# Patient Record
Sex: FEMALE | Race: WHITE | Employment: FULL TIME | URBAN - METROPOLITAN AREA
[De-identification: names, ages, dates, MRNs, and addresses within clinical notes are randomized per-mention and may not be internally consistent; named-entity substitution may affect disease eponyms.]

---

## 2017-01-09 ENCOUNTER — OFFICE VISIT (OUTPATIENT)
Dept: FAMILY MEDICINE | Facility: CLINIC | Age: 52
End: 2017-01-09
Payer: COMMERCIAL

## 2017-01-09 VITALS
HEART RATE: 71 BPM | DIASTOLIC BLOOD PRESSURE: 67 MMHG | RESPIRATION RATE: 14 BRPM | SYSTOLIC BLOOD PRESSURE: 97 MMHG | BODY MASS INDEX: 22.08 KG/M2 | WEIGHT: 120 LBS | TEMPERATURE: 98.3 F | HEIGHT: 62 IN

## 2017-01-09 DIAGNOSIS — Z12.11 SCREENING FOR COLON CANCER: ICD-10-CM

## 2017-01-09 DIAGNOSIS — Z00.00 ENCOUNTER FOR ROUTINE ADULT HEALTH EXAMINATION WITHOUT ABNORMAL FINDINGS: Primary | ICD-10-CM

## 2017-01-09 DIAGNOSIS — Z30.41 ORAL CONTRACEPTIVE PILL SURVEILLANCE: ICD-10-CM

## 2017-01-09 DIAGNOSIS — Z11.59 NEED FOR HEPATITIS C SCREENING TEST: ICD-10-CM

## 2017-01-09 DIAGNOSIS — J40 BRONCHITIS: ICD-10-CM

## 2017-01-09 DIAGNOSIS — J45.990 EXERCISE-INDUCED ASTHMA: ICD-10-CM

## 2017-01-09 DIAGNOSIS — E78.5 HYPERLIPIDEMIA LDL GOAL <160: ICD-10-CM

## 2017-01-09 LAB
ANION GAP SERPL CALCULATED.3IONS-SCNC: 4 MMOL/L (ref 3–14)
BUN SERPL-MCNC: 11 MG/DL (ref 7–30)
CALCIUM SERPL-MCNC: 8.7 MG/DL (ref 8.5–10.1)
CHLORIDE SERPL-SCNC: 103 MMOL/L (ref 94–109)
CHOLEST SERPL-MCNC: 248 MG/DL
CO2 SERPL-SCNC: 30 MMOL/L (ref 20–32)
CREAT SERPL-MCNC: 0.82 MG/DL (ref 0.52–1.04)
GFR SERPL CREATININE-BSD FRML MDRD: 73 ML/MIN/1.7M2
GLUCOSE SERPL-MCNC: 91 MG/DL (ref 70–99)
HDLC SERPL-MCNC: 87 MG/DL
LDLC SERPL CALC-MCNC: 139 MG/DL
NONHDLC SERPL-MCNC: 161 MG/DL
POTASSIUM SERPL-SCNC: 3.9 MMOL/L (ref 3.4–5.3)
SODIUM SERPL-SCNC: 137 MMOL/L (ref 133–144)
TRIGL SERPL-MCNC: 108 MG/DL

## 2017-01-09 PROCEDURE — 86803 HEPATITIS C AB TEST: CPT | Mod: 90 | Performed by: NURSE PRACTITIONER

## 2017-01-09 PROCEDURE — 80048 BASIC METABOLIC PNL TOTAL CA: CPT | Performed by: NURSE PRACTITIONER

## 2017-01-09 PROCEDURE — 99000 SPECIMEN HANDLING OFFICE-LAB: CPT | Performed by: NURSE PRACTITIONER

## 2017-01-09 PROCEDURE — 80061 LIPID PANEL: CPT | Performed by: NURSE PRACTITIONER

## 2017-01-09 PROCEDURE — 99212 OFFICE O/P EST SF 10 MIN: CPT | Mod: 25 | Performed by: NURSE PRACTITIONER

## 2017-01-09 PROCEDURE — 36415 COLL VENOUS BLD VENIPUNCTURE: CPT | Performed by: NURSE PRACTITIONER

## 2017-01-09 PROCEDURE — 99396 PREV VISIT EST AGE 40-64: CPT | Performed by: NURSE PRACTITIONER

## 2017-01-09 RX ORDER — LEVONORGESTREL AND ETHINYL ESTRADIOL 0.15-0.03
1 KIT ORAL DAILY
Qty: 84 TABLET | Refills: 4 | Status: SHIPPED | OUTPATIENT
Start: 2017-01-09 | End: 2018-01-08

## 2017-01-09 RX ORDER — CODEINE PHOSPHATE AND GUAIFENESIN 10; 100 MG/5ML; MG/5ML
1 SOLUTION ORAL EVERY 4 HOURS PRN
Qty: 120 ML | Refills: 0 | Status: SHIPPED | OUTPATIENT
Start: 2017-01-09 | End: 2018-01-08

## 2017-01-09 RX ORDER — AZITHROMYCIN 250 MG/1
TABLET, FILM COATED ORAL
Qty: 6 TABLET | Refills: 0 | Status: SHIPPED | OUTPATIENT
Start: 2017-01-09 | End: 2018-01-08

## 2017-01-09 NOTE — MR AVS SNAPSHOT
After Visit Summary   1/9/2017    Linda Campuzano    MRN: [id number]           Patient Information     Date Of Birth          1965        Visit Information        Provider Department      1/9/2017 9:45 AM Anne Marie Jack [place]N [facility] [place]        Today's Diagnoses     Encounter for routine adult health examination without abnormal findings    -  1     Bronchitis         Hyperlipidemia LDL goal <160         Oral contraceptive pill surveillance         Screening for colon cancer           Care Instructions      Preventive Health Recommendations  Female Ages 50 - 64    Yearly exam: See your health care provider every year in order to  o Review health changes.   o Discuss preventive care.    o Review your medicines if your doctor has prescribed any.      Get a Pap test every three years (unless you have an abnormal result and your provider advises testing more often).    If you get Pap tests with HPV test, you only need to test every 5 years, unless you have an abnormal result.     You do not need a Pap test if your uterus was removed (hysterectomy) and you have not had cancer.    You should be tested each year for STDs (sexually transmitted diseases) if you're at risk.     Have a mammogram every 1 to 2 years.    Have a colonoscopy at age 50, or have a yearly FIT test (stool test). These exams screen for colon cancer.      Have a cholesterol test every 5 years, or more often if advised.    Have a diabetes test (fasting glucose) every three years. If you are at risk for diabetes, you should have this test more often.     If you are at risk for osteoporosis (brittle bone disease), think about having a bone density scan (DEXA).    Shots: Get a flu shot each year. Get a tetanus shot every 10 years.    Nutrition:     Eat at least 5 servings of fruits and vegetables each day.    Eat whole-grain bread, whole-wheat pasta and brown rice instead of white grains and rice.    Talk to your provider  about Calcium and Vitamin D.     Lifestyle    Exercise at least 150 minutes a week (30 minutes a day, 5 days a week). This will help you control your weight and prevent disease.    Limit alcohol to one drink per day.    No smoking.     Wear sunscreen to prevent skin cancer.     See your dentist every six months for an exam and cleaning.    See your eye doctor every 1 to 2 years.            Follow-ups after your visit        Future tests that were ordered for you today     Open Future Orders        Priority Expected Expires Ordered    Fecal colorectal cancer screen (FIT) Routine 1/30/2017 4/3/2017 1/9/2017            Who to contact     If you have questions or need follow up information about today's clinic visit or your schedule please contact [facility] [place] directly at [phone].  Normal or non-critical lab and imaging results will be communicated to you by MyChart, letter or phone within 4 business days after the clinic has received the results. If you do not hear from us within 7 days, please contact the clinic through [employer] or phone. If you have a critical or abnormal lab result, we will notify you by phone as soon as possible.  Submit refill requests through [employer] or call your pharmacy and they will forward the refill request to us. Please allow 3 business days for your refill to be completed.          Additional Information About Your Visit        [employer]inc Information     [employer] gives you secure access to your electronic health record. If you see a primary care provider, you can also send messages to your care team and make appointments. If you have questions, please call your primary care clinic.  If you do not have a primary care provider, please call [phone] and they will assist you.        Care EveryWhere ID     This is your Care EveryWhere ID. This could be used by other organizations to access your [place] medical records  [id number]        Your Vitals Were     Pulse  "Temperature Respirations Height BMI (Body Mass Index) Last Period    71 98.3  F (36.8  C) (Tympanic) 14 5' 1.5\" (1.562 m) 22.31 kg/m2 12/26/2016 (Approximate)       Blood Pressure from Last 3 Encounters:   01/09/17 97/67   02/26/16 106/69   02/25/15 115/74    Weight from Last 3 Encounters:   01/09/17 120 lb (54.432 kg)   02/26/16 118 lb (53.524 kg)   02/25/15 119 lb (53.978 kg)              We Performed the Following     Basic metabolic panel  (Ca, Cl, CO2, Creat, Gluc, K, Na, BUN)     Lipid Profile with reflex to direct LDL          Today's Medication Changes          These changes are accurate as of: 1/9/17 10:37 AM.  If you have any questions, ask your nurse or doctor.               Start taking these medicines.        Dose/Directions    azithromycin 250 MG tablet   Commonly known as:  ZITHROMAX   Used for:  Bronchitis   Started by:  Anne Marie Jack APRN CNP        Two tablets first day, then one tablet daily for four days.   Quantity:  6 tablet   Refills:  0       guaiFENesin-codeine 100-10 MG/5ML Soln solution   Commonly known as:  ROBITUSSIN AC   Used for:  Bronchitis   Started by:  Anne Marie Jack APRN CNP        Dose:  1 tsp.   Take 5 mLs by mouth every 4 hours as needed for cough   Quantity:  120 mL   Refills:  0            Where to get your medicines      Some of these will need a paper prescription and others can be bought over the counter.  Ask your nurse if you have questions.     Bring a paper prescription for each of these medications    - azithromycin 250 MG tablet  - guaiFENesin-codeine 100-10 MG/5ML Soln solution             Primary Care Provider Office Phone # Fax #    DENAE Rios [phone]       [address]  [place] [address]        Thank you!     Thank you for choosing [facility] [place]  for your care. Our goal is always to provide you with excellent care. Hearing back from our patients is one way we can continue to improve our " services. Please take a few minutes to complete the written survey that you may receive in the mail after your visit with us. Thank you!             Your Updated Medication List - Protect others around you: Learn how to safely use, store and throw away your medicines at www.disposemymeds.org.          This list is accurate as of: 1/9/17 10:37 AM.  Always use your most recent med list.                   Brand Name Dispense Instructions for use    albuterol 108 (90 BASE) MCG/ACT Inhaler    albuterol    1 each    Inhale 2 puffs into the lungs 4 times daily as needed       azithromycin 250 MG tablet    ZITHROMAX    6 tablet    Two tablets first day, then one tablet daily for four days.       CALCIUM MAGNESIUM PO      Take by mouth daily       guaiFENesin-codeine 100-10 MG/5ML Soln solution    ROBITUSSIN AC    120 mL    Take 5 mLs by mouth every 4 hours as needed for cough       levonorgestrel-ethinyl estradiol 0.15-30 MG-MCG per tablet    ALTAVERA    84 tablet    Take 1 tablet by mouth daily       multivitamin per tablet     0    1 Daily       VITAMIN C TABS   OR     0    1-2 Daily       vitamin E 400 UNIT capsule     0    1 Daily

## 2017-01-09 NOTE — PROGRESS NOTES
SUBJECTIVE:     CC: Linda Campuzano is an 51 year old woman who presents for preventive health visit.     Healthy Habits:    Do you get at least three servings of calcium containing foods daily (dairy, green leafy vegetables, etc.)? no, taking calcium and/or vitamin D supplement: yes     Amount of exercise or daily activities, outside of work: 4-5 day(s) per week    Problems taking medications regularly No    Medication side effects: No    Have you had an eye exam in the past two years? no    Do you see a dentist twice per year? yes  Do you have sleep apnea, excessive snoring or daytime drowsiness? Yes       Additional problems: URI        Today's PHQ-2 Score:   PHQ-2 ( 1999 Pfizer) 1/9/2017 2/26/2016   Q1: Little interest or pleasure in doing things 0 0   Q2: Feeling down, depressed or hopeless 0 0   PHQ-2 Score 0 0       Abuse: Current or Past(Physical, Sexual or Emotional)- No  Do you feel safe in your environment - Yes    Social History   Substance Use Topics     Smoking status: Never Smoker      Smokeless tobacco: Never Used     Alcohol Use: 3.0 - 3.6 oz/week     5-6 Standard drinks or equivalent per week      Comment: weekends moderate     The patient does not drink >3 drinks per day nor >7 drinks per week.    Recent Labs   Lab Test  02/26/16   0929  02/25/15   0851  11/14/13   0909   CHOL  291*  252*  275*   HDL  88  82  80   LDL  187*  155*  166*   TRIG  81  73  144   CHOLHDLRATIO   --   3.1  3.4   NHDL  203*   --    --        Reviewed orders with patient.  Reviewed health maintenance and updated orders accordingly - Yes    Mammo Decision Support:  Patient over age 50, mutual decision to screen reflected in health maintenance.  Pertinent mammograms are reviewed under the imaging tab.    History of abnormal Pap smear: NO - age 30- 65 PAP every 3 years recommended    All Histories reviewed and updated in Epic.  Past Medical History   Diagnosis Date     POD (perioral dermatitis)      Papanicolaou smear of  "cervix with atypical squamous cells cannot exclude high grade squamous intraepithelial lesion (ASC-H) 1992     cone biopsy- ca in situ     Abnormal Papanicolaou smear of cervix and cervical HPV 2001     cautery     Contraception      Schatzki's ring 2103     dilated     Environmental allergies      [place] trees and some flowers      Past Surgical History   Procedure Laterality Date     Hc enlarge breast with implant  1997     saline     Lasik bilateral       Hc repair ing hernia,6mo-5yr,reduc       bilateral     Upper gi endoscopy       acid reflux and Schatzki's ring     Colonoscopy  2007     + FH. NL- repeat 5 years     Social Hx: . Has 2 daughters.  for [employer]     Contraception: BCP     ROS:  C: NEGATIVE for fever, chills, change in weight  I: NEGATIVE for worrisome rashes, moles or lesions  E: NEGATIVE for vision changes or irritation; LASIK  ENT: NEGATIVE for ear, mouth and throat problems  R: POSITIVE for significant cough. Started with typical cold symptoms 1.5 weeks ago. Now chest congested and expectorates green colored phlegm. Unable to exercise as usual. Albuterol inhale prior to exercise.  B: NEGATIVE for masses, tenderness or discharge. Saline implants.  CV: NEGATIVE for chest pain, palpitations or peripheral edema  GI: NEGATIVE for nausea, abdominal pain, heartburn, or change in bowel habits. Due for colonoscopy but declines. Father had colon cancer. Colonoscopy 2007 had to be cut short due to hypotension. She didn't go back for barium enema as advised.      : NEGATIVE for unusual urinary or vaginal symptoms. Periods are regular. Wants to take BCP's for one more year. Worried about menopausal symptoms.   M: NEGATIVE for significant arthralgias or myalgia  N: NEGATIVE for weakness, dizziness or paresthesias  P: NEGATIVE for changes in mood or affect      OBJECTIVE:     BP 97/67 mmHg  Pulse 71  Temp(Src) 98.3  F (36.8  C) (Tympanic)  Resp 14  Ht 5' 1.5\" (1.562 " m)  Wt 120 lb (54.432 kg)  BMI 22.31 kg/m2  LMP 12/26/2016 (Approximate)  EXAM:  GENERAL: healthy, alert and deep cough  EYES: Eyes grossly normal to inspection, PERRL and conjunctivae and sclerae normal  HENT: ear canals and TM's normal, nose and mouth without ulcers or lesions  NECK: no adenopathy, no asymmetry, masses, or scars and thyroid normal to palpation  RESP: lungs clear to auscultation - no rales, rhonchi or wheezes  BREAST: normal without masses, tenderness or nipple discharge and no palpable axillary masses or adenopathy  CV: regular rate and rhythm, normal S1 S2, no S3 or S4, no murmur, click or rub, no peripheral edema and peripheral pulses strong  ABDOMEN: soft, nontender, no hepatosplenomegaly, no masses and bowel sounds normal  MS: no gross musculoskeletal defects noted, no edema  SKIN: no suspicious lesions or rashes  NEURO: Normal strength and tone, mentation intact and speech normal  PSYCH: mentation appears normal, affect normal/bright    ASSESSMENT/PLAN:      was seen today for physical.    Diagnoses and all orders for this visit:    Encounter for routine adult health examination without abnormal findings  -     Basic metabolic panel  (Ca, Cl, CO2, Creat, Gluc, K, Na, BUN)    Bronchitis  -     azithromycin (ZITHROMAX) 250 MG tablet; Two tablets first day, then one tablet daily for four days.  -     guaiFENesin-codeine (ROBITUSSIN AC) 100-10 MG/5ML SOLN solution; Take 5 mLs by mouth every 4 hours as needed for cough    Hyperlipidemia LDL goal <160  -     Lipid Profile with reflex to direct LDL    Oral contraceptive pill surveillance  -     levonorgestrel-ethinyl estradiol (ALTAVERA) 0.15-30 MG-MCG per tablet; Take 1 tablet by mouth daily    Exercise-induced asthma    Screening for colon cancer  -     Fecal colorectal cancer screen (FIT); Future    Need for hepatitis C screening test  -     Hepatitis C antibody        Discussed condition (bronchitis) and symptomatic cares  I have  "discussed with patient the risks, benefits, medications, treatment options and modalities.  azithromycin (ZITHROMAX) 250 MG tablet- will fill if symptom fail to resolve or worsen in the next week   guaiFENesin-codeine (ROBITUSSIN AC) 100-10 MG/5ML SOLN solution  Follow up if not improving as expected.    Encourage to do FIT test but also colonoscopy       COUNSELING:   Reviewed preventive health counseling, as reflected in patient instructions  Special attention given to:        Regular exercise       Healthy diet/nutrition       Vision screening       Contraception       Colon cancer screening       (Mali)menopause management         reports that she has never smoked. She has never used smokeless tobacco.    Estimated body mass index is 22.31 kg/(m^2) as calculated from the following:    Height as of this encounter: 5' 1.5\" (1.562 m).    Weight as of this encounter: 120 lb (54.432 kg).       Counseling Resources:  ATP IV Guidelines  Pooled Cohorts Equation Calculator  Breast Cancer Risk Calculator  FRAX Risk Assessment  ICSI Preventive Guidelines  Dietary Guidelines for Americans, 2010  USDA's MyPlate  ASA Prophylaxis  Lung CA Screening    DENAE Rios CNP  [facility]  "

## 2017-01-09 NOTE — NURSING NOTE
"Chief Complaint   Patient presents with     Physical       Initial BP 97/67 mmHg  Pulse 71  Temp(Src) 98.3  F (36.8  C) (Tympanic)  Resp 14  Ht 5' 1.5\" (1.562 m)  Wt 120 lb (54.432 kg)  BMI 22.31 kg/m2  LMP 12/26/2016 (Approximate) Estimated body mass index is 22.31 kg/(m^2) as calculated from the following:    Height as of this encounter: 5' 1.5\" (1.562 m).    Weight as of this encounter: 120 lb (54.432 kg).  BP completed using cuff size: shaniak Peña CMA      "

## 2017-01-10 LAB — HCV AB SERPL QL IA: NORMAL

## 2017-01-13 ENCOUNTER — MYC MEDICAL ADVICE (OUTPATIENT)
Dept: FAMILY MEDICINE | Facility: CLINIC | Age: 52
End: 2017-01-13

## 2017-01-13 DIAGNOSIS — J45.990 EXERCISE-INDUCED ASTHMA: Primary | ICD-10-CM

## 2017-01-13 RX ORDER — ALBUTEROL SULFATE 90 UG/1
2 AEROSOL, METERED RESPIRATORY (INHALATION) EVERY 4 HOURS PRN
Qty: 1 INHALER | Refills: 3 | Status: SHIPPED | OUTPATIENT
Start: 2017-01-13 | End: 2018-01-08

## 2017-01-13 NOTE — TELEPHONE ENCOUNTER
Albuterol       Last Written Prescription Date: 2/26/2016  Last Fill Quantity: 1, # refills: 3    Last Office Visit with [facility], [facility] or [facility] prescribing provider:  1/9/2017   Future Office Visit:       Date of Last Asthma Action Plan Letter:   There are no preventive care reminders to display for this patient.   Asthma Control Test: No flowsheet data found.    Date of Last Spirometry Test:   No results found for this or any previous visit.      Routing refill request to provider for review/approval because:  No ACT per [facility] protocol  Kylah Walker RN  [employer]

## 2018-01-08 ENCOUNTER — OFFICE VISIT (OUTPATIENT)
Dept: FAMILY MEDICINE | Facility: CLINIC | Age: 53
End: 2018-01-08
Payer: COMMERCIAL

## 2018-01-08 VITALS
BODY MASS INDEX: 23.22 KG/M2 | TEMPERATURE: 98.5 F | WEIGHT: 123 LBS | DIASTOLIC BLOOD PRESSURE: 60 MMHG | HEIGHT: 61 IN | SYSTOLIC BLOOD PRESSURE: 100 MMHG | HEART RATE: 59 BPM

## 2018-01-08 DIAGNOSIS — Z12.11 SCREEN FOR COLON CANCER: ICD-10-CM

## 2018-01-08 DIAGNOSIS — J30.2 CHRONIC SEASONAL ALLERGIC RHINITIS, UNSPECIFIED TRIGGER: ICD-10-CM

## 2018-01-08 DIAGNOSIS — Z00.00 ROUTINE HEALTH MAINTENANCE: Primary | ICD-10-CM

## 2018-01-08 DIAGNOSIS — Z12.39 SCREENING FOR BREAST CANCER: ICD-10-CM

## 2018-01-08 DIAGNOSIS — Z30.41 ORAL CONTRACEPTIVE PILL SURVEILLANCE: ICD-10-CM

## 2018-01-08 PROCEDURE — 80061 LIPID PANEL: CPT | Performed by: INTERNAL MEDICINE

## 2018-01-08 PROCEDURE — 99396 PREV VISIT EST AGE 40-64: CPT | Performed by: INTERNAL MEDICINE

## 2018-01-08 PROCEDURE — 82947 ASSAY GLUCOSE BLOOD QUANT: CPT | Performed by: INTERNAL MEDICINE

## 2018-01-08 PROCEDURE — 36415 COLL VENOUS BLD VENIPUNCTURE: CPT | Performed by: INTERNAL MEDICINE

## 2018-01-08 RX ORDER — ALBUTEROL SULFATE 90 UG/1
2 AEROSOL, METERED RESPIRATORY (INHALATION) EVERY 4 HOURS PRN
Qty: 1 INHALER | Refills: 3 | Status: SHIPPED | OUTPATIENT
Start: 2018-01-08 | End: 2019-07-04

## 2018-01-08 RX ORDER — LEVONORGESTREL AND ETHINYL ESTRADIOL 0.15-0.03
1 KIT ORAL DAILY
Qty: 84 TABLET | Refills: 3 | Status: SHIPPED | OUTPATIENT
Start: 2018-01-08 | End: 2018-12-15

## 2018-01-08 NOTE — PROGRESS NOTES
SUBJECTIVE:   CC: Linda Campuzano is an 52 year old woman who presents for preventive health visit.     Lives with her . Older daughter and boyfriend live in basement, their own separate space.  Her younger daughter, Nicole, was in a severe car accident about 7 months ago and suffered brain bleed and TBI.  She is is at [facility], still not talking, cannot move her right side.  This has been, of course, very stressful and traumatic for .  She has good support from her family and friends, gets encouragement from her Rastafarian community as well.  Work has been understanding, she is an  at [employer].      Healthy Habits:    Do you get at least three servings of calcium containing foods daily (dairy, green leafy vegetables, etc.)? yes    Amount of exercise or daily activities, outside of work: 5-7 day(s) per week    Problems taking medications regularly No    Medication side effects: No    Have you had an eye exam in the past two years? no    Do you see a dentist twice per year? yes    Do you have sleep apnea, excessive snoring or daytime drowsiness?no      Still taking OCP. Periods are getting shorter and lighter. Knows she is getting close to time when stopping is recommended, however, cannot handle making that change right now with everything going on with her daughter.  She has no history of early onset CAD, stroke or breast cancer in the family.     Upper back - cortisone injections in upper back at [facility] this summer.  Really helped.         Today's PHQ-2 Score:   PHQ-2 ( 1999 Pfizer) 1/9/2017 2/26/2016   Q1: Little interest or pleasure in doing things 0 0   Q2: Feeling down, depressed or hopeless 0 0   PHQ-2 Score 0 0         Abuse: Current or Past(Physical, Sexual or Emotional)- NO  Do you feel safe in your environment - YES  Social History   Substance Use Topics     Smoking status: Never Smoker     Smokeless tobacco: Never Used     Alcohol use 3.0 - 3.6 oz/week     5 - 6  Standard drinks or equivalent per week      Comment: weekends moderate     If you drink alcohol do you typically have >3 drinks per day or >7 drinks per week? No                     Reviewed orders with patient.  Reviewed health maintenance and updated orders accordingly - Yes  Patient Active Problem List   Diagnosis     Other kyphoscoliosis and scoliosis     Family history of colon cancer     Schatzki's ring     Oral contraceptive pill surveillance     Past Surgical History:   Procedure Laterality Date     COLONOSCOPY      + FH. NL- repeat 5 years     HC ENLARGE BREAST WITH IMPLANT      saline     HC REPAIR ING HERNIA,6MO-5YR,REDUC      bilateral     LASIK BILATERAL       UPPER GI ENDOSCOPY      acid reflux and Schatzki's ring       Social History   Substance Use Topics     Smoking status: Never Smoker     Smokeless tobacco: Never Used     Alcohol use 3.0 - 3.6 oz/week     5 - 6 Standard drinks or equivalent per week      Comment: weekends moderate     Family History   Problem Relation Age of Onset     CANCER Father      lung & brain     Cancer - colorectal Father       age 52     Hypertension Mother      GASTROINTESTINAL DISEASE Mother       of ischemic bowel, age 75     C.A.D. Maternal Grandfather          Current Outpatient Prescriptions   Medication Sig Dispense Refill     levonorgestrel-ethinyl estradiol (ALTAVERA) 0.15-30 MG-MCG per tablet Take 1 tablet by mouth daily 84 tablet 3     albuterol (PROAIR HFA) 108 (90 BASE) MCG/ACT Inhaler Inhale 2 puffs into the lungs every 4 hours as needed 1 Inhaler 3     Calcium-Magnesium-Vitamin D (CALCIUM MAGNESIUM PO) Take by mouth daily       MULTIVITAMINS OR TABS 1 Daily 0 0     VITAMIN E 400 IU OR CAPS 1 Daily 0 0     VITAMIN C TABS   OR 1-2 Daily 0 0     [DISCONTINUED] albuterol (ALBUTEROL) 108 (90 BASE) MCG/ACT Inhaler Inhale 2 puffs into the lungs every 4 hours as needed 1 Inhaler 3     [DISCONTINUED] levonorgestrel-ethinyl estradiol (ALTAVERA)  "0.15-30 MG-MCG per tablet Take 1 tablet by mouth daily 84 tablet 4         Patient over age 50, mutual decision to screen reflected in health maintenance.      Pertinent mammograms are reviewed under the imaging tab.  History of abnormal Pap smear: NO - age 30- 65 PAP every 3 years recommended    Reviewed and updated as needed this visit by clinical staff  Tobacco  Allergies  Meds         Reviewed and updated as needed this visit by Provider              ROS:  C: NEGATIVE for fever, chills, change in weight  I: NEGATIVE for worrisome rashes, moles or lesions  E: NEGATIVE for vision changes or irritation  ENT: NEGATIVE for ear, mouth and throat problems  R: NEGATIVE for significant cough or SOB  B: NEGATIVE for masses, tenderness or discharge  CV: NEGATIVE for chest pain, palpitations or peripheral edema  GI: NEGATIVE for nausea, abdominal pain, heartburn, or change in bowel habits  : NEGATIVE for unusual urinary or vaginal symptoms. Periods are regular.  M: NEGATIVE for significant arthralgias or myalgia  N: NEGATIVE for weakness, dizziness or paresthesias  P: stress as above, otherwise NEGATIVE for changes in mood or affect    OBJECTIVE:   /60 (BP Location: Right arm, Patient Position: Chair, Cuff Size: Adult Regular)  Pulse 59  Temp 98.5  F (36.9  C) (Tympanic)  Ht 5' 1\" (1.549 m)  Wt 123 lb (55.8 kg)  LMP 12/12/2017  BMI 23.24 kg/m2  EXAM:  GENERAL: healthy, alert and no distress  EYES: Eyes grossly normal to inspection, PERRL and conjunctivae and sclerae normal  HENT: ear canals and TM's normal, mouth without ulcers or lesions  NECK: no adenopathy, no asymmetry, masses, or scars and thyroid normal to palpation  RESP: lungs clear to auscultation - no rales, rhonchi or wheezes  BREAST: + breast implants, normal without masses, tenderness or nipple discharge and no palpable axillary masses or adenopathy  CV: regular rate and rhythm, normal S1 S2, no S3 or S4, no murmur, click or rub, no peripheral " "edema and peripheral pulses strong  ABDOMEN: soft, nontender, no hepatosplenomegaly, no masses and bowel sounds normal  MS: no gross musculoskeletal defects noted, no edema  SKIN: no suspicious lesions or rashes  NEURO: Normal strength and tone, mentation intact and speech normal  PSYCH: mentation appears normal, affect normal/bright    ASSESSMENT/PLAN:   1. Routine health maintenance  Likes to keep close eye on cholesterol due to family history.   - Lipid panel reflex to direct LDL Fasting  - Glucose    2. Oral contraceptive pill surveillance  Okay to continue for another year or so, given the circumstances.  She does need to get mammogram done.   - levonorgestrel-ethinyl estradiol (ALTAVERA) 0.15-30 MG-MCG per tablet; Take 1 tablet by mouth daily  Dispense: 84 tablet; Refill: 3    3. Chronic seasonal allergic rhinitis, unspecified trigger  Takes albuterol as needed with bad allergies or change of season.   - albuterol (PROAIR HFA) 108 (90 BASE) MCG/ACT Inhaler; Inhale 2 puffs into the lungs every 4 hours as needed  Dispense: 1 Inhaler; Refill: 3    4. Screen for colon cancer  - Fecal colorectal cancer screen (FIT); Future    5. Screening for breast cancer  - *MA Screening Digital Bilateral; Future    COUNSELING:   Reviewed preventive health counseling, as reflected in patient instructions       Regular exercise       Healthy diet/nutrition       Osteoporosis Prevention/Bone Health       (Mali)menopause management        Declined flu shot        reports that she has never smoked. She has never used smokeless tobacco.    Estimated body mass index is 23.24 kg/(m^2) as calculated from the following:    Height as of this encounter: 5' 1\" (1.549 m).    Weight as of this encounter: 123 lb (55.8 kg).         Counseling Resources:  ATP IV Guidelines  Pooled Cohorts Equation Calculator  Breast Cancer Risk Calculator  FRAX Risk Assessment  ICSI Preventive Guidelines  Dietary Guidelines for Americans, 2010  USDA's " MyPlate  ASA Prophylaxis  Lung CA Screening    Catrachita Leonard MD  [facility]

## 2018-01-08 NOTE — NURSING NOTE
"Chief Complaint   Patient presents with     Physical     non-fasting       Initial /60 (BP Location: Right arm, Patient Position: Chair, Cuff Size: Adult Regular)  Pulse 59  Temp 98.5  F (36.9  C) (Tympanic)  Ht 5' 1\" (1.549 m)  Wt 123 lb (55.8 kg)  LMP 12/12/2017  BMI 23.24 kg/m2 Estimated body mass index is 23.24 kg/(m^2) as calculated from the following:    Height as of this encounter: 5' 1\" (1.549 m).    Weight as of this encounter: 123 lb (55.8 kg).  Medication Reconciliation: complete  "

## 2018-01-08 NOTE — MR AVS SNAPSHOT
After Visit Summary   1/8/2018    Linda Campuznao    MRN: [id number]           Patient Information     Date Of Birth          1965        Visit Information        Provider Department      1/8/2018 11:00 AM Catrachita Leonard MD [facility] [place]        Today's Diagnoses     Routine health maintenance    -  1    Screen for colon cancer        Screening for malignant neoplasm of cervix        Need for prophylactic vaccination and inoculation against influenza        Oral contraceptive pill surveillance        Exercise-induced asthma          Care Instructions      Preventive Health Recommendations  Female Ages 50 - 64    Yearly exam: See your health care provider every year in order to  o Review health changes.   o Discuss preventive care.    o Review your medicines if your doctor has prescribed any.      Get a Pap test every three years (unless you have an abnormal result and your provider advises testing more often).    If you get Pap tests with HPV test, you only need to test every 5 years, unless you have an abnormal result.     You do not need a Pap test if your uterus was removed (hysterectomy) and you have not had cancer.    You should be tested each year for STDs (sexually transmitted diseases) if you're at risk.     Have a mammogram every 1 to 2 years.    Have a colonoscopy at age 50, or have a yearly FIT test (stool test). These exams screen for colon cancer.      Have a cholesterol test every 5 years, or more often if advised.    Have a diabetes test (fasting glucose) every three years. If you are at risk for diabetes, you should have this test more often.     If you are at risk for osteoporosis (brittle bone disease), think about having a bone density scan (DEXA).    Shots: Get a flu shot each year. Get a tetanus shot every 10 years.    Nutrition:     Eat at least 5 servings of fruits and vegetables each day.    Eat whole-grain bread, whole-wheat pasta and brown rice  instead of white grains and rice.    Talk to your provider about Calcium and Vitamin D.     Lifestyle    Exercise at least 150 minutes a week (30 minutes a day, 5 days a week). This will help you control your weight and prevent disease.    Limit alcohol to one drink per day.    No smoking.     Wear sunscreen to prevent skin cancer.     See your dentist every six months for an exam and cleaning.    See your eye doctor every 1 to 2 years.            Follow-ups after your visit        Follow-up notes from your care team     Return in about 1 year (around 1/8/2019) for Physical Exam.      Future tests that were ordered for you today     Open Future Orders        Priority Expected Expires Ordered    Fecal colorectal cancer screen (FIT) Routine 1/29/2018 4/2/2018 1/8/2018            Who to contact     If you have questions or need follow up information about today's clinic visit or your schedule please contact [facility] [place] directly at [phone].  Normal or non-critical lab and imaging results will be communicated to you by Tredhart, letter or phone within 4 business days after the clinic has received the results. If you do not hear from us within 7 days, please contact the clinic through ExceleraRxt or phone. If you have a critical or abnormal lab result, we will notify you by phone as soon as possible.  Submit refill requests through InsightsOne or call your pharmacy and they will forward the refill request to us. Please allow 3 business days for your refill to be completed.          Additional Information About Your Visit        [id number] Information     InsightsOne gives you secure access to your electronic health record. If you see a primary care provider, you can also send messages to your care team and make appointments. If you have questions, please call your primary care clinic.  If you do not have a primary care provider, please call [phone] and they will assist you.        Care EveryWhere ID     This  "is your Care EveryWhere ID. This could be used by other organizations to access your [facility] medical records  [id number]        Your Vitals Were     Pulse Temperature Height Last Period BMI (Body Mass Index)       59 98.5  F (36.9  C) (Tympanic) 5' 1\" (1.549 m) 12/12/2017 23.24 kg/m2        Blood Pressure from Last 3 Encounters:   01/08/18 100/60   01/09/17 97/67   02/26/16 106/69    Weight from Last 3 Encounters:   01/08/18 123 lb (55.8 kg)   01/09/17 120 lb (54.4 kg)   02/26/16 118 lb (53.5 kg)              We Performed the Following     Glucose     Lipid panel reflex to direct LDL Fasting          Where to get your medicines      These medications were sent to [facility]/pharmacy #[id number] - [place], [place] - [address] [facility]  [address], [place] [address]     Phone:  [phone]     albuterol 108 (90 BASE) MCG/ACT Inhaler    levonorgestrel-ethinyl estradiol 0.15-30 MG-MCG per tablet          Primary Care Provider Office Phone # Fax #    Catrachita Leonard [phone]       [address]  [place] [address]        Equal Access to Services     [facility] AH: Hadii jannet llanes hadasho Soomaali, waaxda luqadaha, qaybta kaalmada adeegyada, gustabo oconnell. So [facility] [phone].    ATENCIÓN: Si habla español, tiene a garcia disposición servicios gratuitos de asistencia lingüística. Llame al [phone].    We comply with applicable federal civil rights laws and [place] laws. We do not discriminate on the basis of race, color, national origin, age, disability, sex, sexual orientation, or gender identity.            Thank you!     Thank you for choosing [facility] [place]  for your care. Our goal is always to provide you with excellent care. Hearing back from our patients is one way we can continue to improve our services. Please take a few minutes to complete the written survey that you may receive in the mail after your visit with us. Thank you!   "           Your Updated Medication List - Protect others around you: Learn how to safely use, store and throw away your medicines at www.disposemymeds.org.          This list is accurate as of: 1/8/18 11:55 AM.  Always use your most recent med list.                   Brand Name Dispense Instructions for use Diagnosis    albuterol 108 (90 BASE) MCG/ACT Inhaler    PROAIR HFA    1 Inhaler    Inhale 2 puffs into the lungs every 4 hours as needed    Exercise-induced asthma       CALCIUM MAGNESIUM PO      Take by mouth daily        levonorgestrel-ethinyl estradiol 0.15-30 MG-MCG per tablet    ALTAVERA    84 tablet    Take 1 tablet by mouth daily    Oral contraceptive pill surveillance       multivitamin per tablet     0    1 Daily        VITAMIN C TABS   OR     0    1-2 Daily        vitamin E 400 UNIT capsule     0    1 Daily

## 2018-01-09 LAB
CHOLEST SERPL-MCNC: 278 MG/DL
GLUCOSE SERPL-MCNC: 76 MG/DL (ref 70–99)
HDLC SERPL-MCNC: 95 MG/DL
LDLC SERPL CALC-MCNC: 162 MG/DL
NONHDLC SERPL-MCNC: 183 MG/DL
TRIGL SERPL-MCNC: 104 MG/DL

## 2018-03-14 ENCOUNTER — OFFICE VISIT (OUTPATIENT)
Dept: FAMILY MEDICINE | Facility: CLINIC | Age: 53
End: 2018-03-14
Payer: COMMERCIAL

## 2018-03-14 VITALS
SYSTOLIC BLOOD PRESSURE: 110 MMHG | DIASTOLIC BLOOD PRESSURE: 60 MMHG | HEART RATE: 69 BPM | OXYGEN SATURATION: 98 % | TEMPERATURE: 99 F | WEIGHT: 122 LBS | BODY MASS INDEX: 23.05 KG/M2

## 2018-03-14 DIAGNOSIS — J20.9 ACUTE BRONCHITIS, UNSPECIFIED ORGANISM: Primary | ICD-10-CM

## 2018-03-14 PROCEDURE — 99213 OFFICE O/P EST LOW 20 MIN: CPT | Performed by: FAMILY MEDICINE

## 2018-03-14 RX ORDER — AZITHROMYCIN 250 MG/1
TABLET, FILM COATED ORAL
Qty: 6 TABLET | Refills: 0 | Status: SHIPPED | OUTPATIENT
Start: 2018-03-14 | End: 2018-10-12

## 2018-03-14 NOTE — NURSING NOTE
"Chief Complaint   Patient presents with     Fever       Initial /60  Pulse 69  Temp 99  F (37.2  C) (Tympanic)  Wt 122 lb (55.3 kg)  LMP 03/04/2018  SpO2 98%  BMI 23.05 kg/m2 Estimated body mass index is 23.05 kg/(m^2) as calculated from the following:    Height as of 1/8/18: 5' 1\" (1.549 m).    Weight as of this encounter: 122 lb (55.3 kg).  Medication Reconciliation: complete    Current Outpatient Prescriptions   Medication Sig Dispense Refill     levonorgestrel-ethinyl estradiol (ALTAVERA) 0.15-30 MG-MCG per tablet Take 1 tablet by mouth daily 84 tablet 3     albuterol (PROAIR HFA) 108 (90 BASE) MCG/ACT Inhaler Inhale 2 puffs into the lungs every 4 hours as needed 1 Inhaler 3     Calcium-Magnesium-Vitamin D (CALCIUM MAGNESIUM PO) Take by mouth daily       MULTIVITAMINS OR TABS 1 Daily 0 0     VITAMIN E 400 IU OR CAPS 1 Daily 0 0     VITAMIN C TABS   OR 1-2 Daily 0 0       Renato LEDBETTER CMA  "

## 2018-03-14 NOTE — MR AVS SNAPSHOT
After Visit Summary   3/14/2018    Linda Campuzano    MRN: [id number]           Patient Information     Date Of Birth          1965        Visit Information        Provider Department      3/14/2018 12:40 PM Suhail Duran MD [facility] [place]        Today's Diagnoses     Acute bronchitis, unspecified organism    -  1       Follow-ups after your visit        Follow-up notes from your care team     Return in about 2 weeks (around 3/28/2018), or if symptoms worsen or fail to improve.      Who to contact     If you have questions or need follow up information about today's clinic visit or your schedule please contact [facility] [place] directly at [phone].  Normal or non-critical lab and imaging results will be communicated to you by MyChart, letter or phone within 4 business days after the clinic has received the results. If you do not hear from us within 7 days, please contact the clinic through Partly Marketplacehart or phone. If you have a critical or abnormal lab result, we will notify you by phone as soon as possible.  Submit refill requests through Axial or call your pharmacy and they will forward the refill request to us. Please allow 3 business days for your refill to be completed.          Additional Information About Your Visit        MyChart Information     Axial gives you secure access to your electronic health record. If you see a primary care provider, you can also send messages to your care team and make appointments. If you have questions, please call your primary care clinic.  If you do not have a primary care provider, please call [phone] and they will assist you.        Care EveryWhere ID     This is your Care EveryWhere ID. This could be used by other organizations to access your [facility] medical records  [id number]        Your Vitals Were     Pulse Temperature Last Period Pulse Oximetry BMI (Body Mass Index)       69 99  F (37.2  C) (Tympanic) 03/04/2018  98% 23.05 kg/m2        Blood Pressure from Last 3 Encounters:   03/14/18 110/60   01/08/18 100/60   01/09/17 97/67    Weight from Last 3 Encounters:   03/14/18 122 lb (55.3 kg)   01/08/18 123 lb (55.8 kg)   01/09/17 120 lb (54.4 kg)              Today, you had the following     No orders found for display         Today's Medication Changes          These changes are accurate as of 3/14/18  1:28 PM.  If you have any questions, ask your nurse or doctor.               Start taking these medicines.        Dose/Directions    azithromycin 250 MG tablet   Commonly known as:  ZITHROMAX   Used for:  Acute bronchitis, unspecified organism   Started by:  Suhail Duran MD        Two tablets first day, then one tablet daily for four days.   Quantity:  6 tablet   Refills:  0            Where to get your medicines      These medications were sent to [facility]/pharmacy #[id number] - [facility] [address] [address]     Phone:  [phone]     azithromycin 250 MG tablet                Primary Care Provider Office Phone # Fax #    Catrachita Leonard [phone]       [address] [facility] [address]        Equal Access to Services     [facility] AH: Hadii jannet ku hadasho Soomaali, waaxda luqadaha, qaybta kaalmada adeegyada, waxannemarie ahujain hayfrancy oconnell. So [facility] [phone].    ATENCIÓN: Si habla español, tiene a garcia disposición servicios gratuitos de asistencia lingüística. LlHolzer Hospital [phone].    We comply with applicable federal civil rights laws and [place] laws. We do not discriminate on the basis of race, color, national origin, age, disability, sex, sexual orientation, or gender identity.            Thank you!     Thank you for choosing [facility]  for your care. Our goal is always to provide you with excellent care. Hearing back from our patients is one way we can continue to improve our services. Please take a few  minutes to complete the written survey that you may receive in the mail after your visit with us. Thank you!             Your Updated Medication List - Protect others around you: Learn how to safely use, store and throw away your medicines at www.disposemymeds.org.          This list is accurate as of 3/14/18  1:28 PM.  Always use your most recent med list.                   Brand Name Dispense Instructions for use Diagnosis    albuterol 108 (90 BASE) MCG/ACT Inhaler    PROAIR HFA    1 Inhaler    Inhale 2 puffs into the lungs every 4 hours as needed    Chronic seasonal allergic rhinitis, unspecified trigger       azithromycin 250 MG tablet    ZITHROMAX    6 tablet    Two tablets first day, then one tablet daily for four days.    Acute bronchitis, unspecified organism       CALCIUM MAGNESIUM PO      Take by mouth daily        levonorgestrel-ethinyl estradiol 0.15-30 MG-MCG per tablet    ALTAVERA    84 tablet    Take 1 tablet by mouth daily    Oral contraceptive pill surveillance       multivitamin per tablet     0    1 Daily        VITAMIN C TABS   OR     0    1-2 Daily        vitamin E 400 UNIT capsule     0    1 Daily

## 2018-10-12 ENCOUNTER — OFFICE VISIT (OUTPATIENT)
Dept: OBGYN | Facility: CLINIC | Age: 53
End: 2018-10-12
Payer: COMMERCIAL

## 2018-10-12 ENCOUNTER — RADIANT APPOINTMENT (OUTPATIENT)
Dept: MAMMOGRAPHY | Facility: CLINIC | Age: 53
End: 2018-10-12
Payer: COMMERCIAL

## 2018-10-12 VITALS
BODY MASS INDEX: 23.55 KG/M2 | SYSTOLIC BLOOD PRESSURE: 116 MMHG | DIASTOLIC BLOOD PRESSURE: 78 MMHG | WEIGHT: 128 LBS | HEIGHT: 62 IN

## 2018-10-12 DIAGNOSIS — Z01.419 ENCOUNTER FOR GYNECOLOGICAL EXAMINATION WITHOUT ABNORMAL FINDING: Primary | ICD-10-CM

## 2018-10-12 DIAGNOSIS — Z12.11 SCREEN FOR COLON CANCER: ICD-10-CM

## 2018-10-12 DIAGNOSIS — Z30.41 USES ORAL CONTRACEPTION: ICD-10-CM

## 2018-10-12 DIAGNOSIS — Z12.31 VISIT FOR SCREENING MAMMOGRAM: ICD-10-CM

## 2018-10-12 PROCEDURE — 77067 SCR MAMMO BI INCL CAD: CPT | Mod: TC

## 2018-10-12 PROCEDURE — 99386 PREV VISIT NEW AGE 40-64: CPT | Performed by: OBSTETRICS & GYNECOLOGY

## 2018-10-12 ASSESSMENT — ANXIETY QUESTIONNAIRES
5. BEING SO RESTLESS THAT IT IS HARD TO SIT STILL: NOT AT ALL
GAD7 TOTAL SCORE: 5
IF YOU CHECKED OFF ANY PROBLEMS ON THIS QUESTIONNAIRE, HOW DIFFICULT HAVE THESE PROBLEMS MADE IT FOR YOU TO DO YOUR WORK, TAKE CARE OF THINGS AT HOME, OR GET ALONG WITH OTHER PEOPLE: SOMEWHAT DIFFICULT
6. BECOMING EASILY ANNOYED OR IRRITABLE: SEVERAL DAYS
3. WORRYING TOO MUCH ABOUT DIFFERENT THINGS: SEVERAL DAYS
1. FEELING NERVOUS, ANXIOUS, OR ON EDGE: SEVERAL DAYS
2. NOT BEING ABLE TO STOP OR CONTROL WORRYING: SEVERAL DAYS
7. FEELING AFRAID AS IF SOMETHING AWFUL MIGHT HAPPEN: NOT AT ALL

## 2018-10-12 ASSESSMENT — PATIENT HEALTH QUESTIONNAIRE - PHQ9: 5. POOR APPETITE OR OVEREATING: SEVERAL DAYS
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
## 2018-10-12 NOTE — PATIENT INSTRUCTIONS
Follow up with your primary care provider for your other medical problems.  Continue self breast exam.  Increase physical activity and exercise.  Usual safety and preventative measures counseling done.  Flu Shot declined today.  Last pap smear was normal and negative for the DNA of high risk HPV subtypes.  No pap was obtained this year.  This was discussed with the patient and she agrees with the plan.  Will arrange colonoscopy this year; referral placed.  Will stop her OCPs once her current prescription runs out and monitor for symptoms of menopause.  If struggles with hot flushes/night sweats or other menopausal symptoms, will call to discuss HRT.

## 2018-10-12 NOTE — MR AVS SNAPSHOT
After Visit Summary   10/12/2018    Linda Campuzano    MRN: [id number]           Patient Information     Date Of Birth          1965        Visit Information        Provider Department      10/12/2018 1:40 PM Disha Duval MD [facility]        Today's Diagnoses     Encounter for gynecological examination without abnormal finding    -  1    Screen for colon cancer        Uses oral contraception          Care Instructions    Follow up with your primary care provider for your other medical problems.  Continue self breast exam.  Increase physical activity and exercise.  Usual safety and preventative measures counseling done.  Flu Shot declined today.  Last pap smear was normal and negative for the DNA of high risk HPV subtypes.  No pap was obtained this year.  This was discussed with the patient and she agrees with the plan.  Will arrange colonoscopy this year; referral placed.  Will stop her OCPs once her current prescription runs out and monitor for symptoms of menopause.  If struggles with hot flushes/night sweats or other menopausal symptoms, will call to discuss HRT.          Follow-ups after your visit        Additional Services     GASTROENTEROLOGY ADULT REF PROCEDURE ONLY       Last Lab Result: Creatinine (mg/dL)       Date                     Value                 01/09/2017               0.82             ----------  Body mass index is 23.79 kg/(m^2).     Needed:  No  Language:  English    Patient will be contacted to schedule procedure.     Please be aware that coverage of these services is subject to the terms and limitations of your health insurance plan.  Call member services at your health plan with any benefit or coverage questions.  Any procedures must be performed at a [place] facility OR coordinated by your clinic's referral office.    Please bring the following with you to your appointment:    (1) Any X-Rays, CTs or MRIs which have been  "performed.  Contact the facility where they were done to arrange for  prior to your scheduled appointment.    (2) List of current medications   (3) This referral request   (4) Any documents/labs given to you for this referral                  Follow-up notes from your care team     Return in about 1 year (around 10/12/2019) for Annual Exam.      Who to contact     If you have questions or need follow up information about today's clinic visit or your schedule please contact [facility] directly at [phone].  Normal or non-critical lab and imaging results will be communicated to you by [employer], letter or phone within 4 business days after the clinic has received the results. If you do not hear from us within 7 days, please contact the clinic through [employer] or phone. If you have a critical or abnormal lab result, we will notify you by phone as soon as possible.  Submit refill requests through "[employer]" or call your pharmacy and they will forward the refill request to us. Please allow 3 business days for your refill to be completed.          Additional Information About Your Visit        [employer] Information     "[employer]" gives you secure access to your electronic health record. If you see a primary care provider, you can also send messages to your care team and make appointments. If you have questions, please call your primary care clinic.  If you do not have a primary care provider, please call [phone] and they will assist you.        Care EveryWhere ID     This is your Care EveryWhere ID. This could be used by other organizations to access your [place] medical records  [id number]        Your Vitals Were     Height Last Period Breastfeeding? BMI (Body Mass Index)          5' 1.5\" (1.562 m) 09/19/2018 (Exact Date) No 23.79 kg/m2         Blood Pressure from Last 3 Encounters:   10/12/18 116/78   03/14/18 110/60   01/08/18 100/60    Weight from Last 3 Encounters:   10/12/18 128 lb (58.1 " kg)   03/14/18 122 lb (55.3 kg)   01/08/18 123 lb (55.8 kg)              We Performed the Following     GASTROENTEROLOGY ADULT REF PROCEDURE ONLY        Primary Care Provider Office Phone # Fax #    Catrachita Leonard [phone]       [address] [facility] [address]        Equal Access to Services     [facility] : Hadii aad ku hadasho Soomaali, waaxda luqadaha, qaybta kaalmada adeegyada, waxay idiin hayaan adeeg kharash la'aan . So [facility] [phone].    ATENCIÓN: Si habla español, tiene a garcia disposición servicios gratuitos de asistencia lingüística. Ricardoame al [phone].    We comply with applicable federal civil rights laws and [place] laws. We do not discriminate on the basis of race, color, national origin, age, disability, sex, sexual orientation, or gender identity.            Thank you!     Thank you for choosing [facility]  for your care. Our goal is always to provide you with excellent care. Hearing back from our patients is one way we can continue to improve our services. Please take a few minutes to complete the written survey that you may receive in the mail after your visit with us. Thank you!             Your Updated Medication List - Protect others around you: Learn how to safely use, store and throw away your medicines at www.disposemymeds.org.          This list is accurate as of 10/12/18  4:46 PM.  Always use your most recent med list.                   Brand Name Dispense Instructions for use Diagnosis    albuterol 108 (90 Base) MCG/ACT inhaler    PROAIR HFA    1 Inhaler    Inhale 2 puffs into the lungs every 4 hours as needed    Chronic seasonal allergic rhinitis, unspecified trigger       CALCIUM MAGNESIUM PO      Take by mouth daily        levonorgestrel-ethinyl estradiol 0.15-30 MG-MCG per tablet    ALTAVERA    84 tablet    Take 1 tablet by mouth daily    Oral contraceptive pill surveillance       multivitamin per tablet     0    1  Daily        VITAMIN C TABS   OR     0    1-2 Daily        vitamin E 400 UNIT capsule     0    1 Daily

## 2018-10-13 ASSESSMENT — ANXIETY QUESTIONNAIRES: GAD7 TOTAL SCORE: 5

## 2018-10-13 ASSESSMENT — PATIENT HEALTH QUESTIONNAIRE - PHQ9: SUM OF ALL RESPONSES TO PHQ QUESTIONS 1-9: 4

## 2018-12-15 DIAGNOSIS — Z30.41 ORAL CONTRACEPTIVE PILL SURVEILLANCE: ICD-10-CM

## 2018-12-17 RX ORDER — LEVONORGESTREL AND ETHINYL ESTRADIOL 0.15-0.03
KIT ORAL
Qty: 84 TABLET | Refills: 0 | Status: SHIPPED | OUTPATIENT
Start: 2018-12-17 | End: 2020-05-13

## 2019-03-15 DIAGNOSIS — Z30.41 ORAL CONTRACEPTIVE PILL SURVEILLANCE: ICD-10-CM

## 2019-03-15 RX ORDER — LEVONORGESTREL AND ETHINYL ESTRADIOL 0.15-0.03
1 KIT ORAL DAILY
Qty: 84 TABLET | Refills: 0 | OUTPATIENT
Start: 2019-03-15

## 2019-03-15 NOTE — TELEPHONE ENCOUNTER
"levonorgestrel-ethinyl estradiol (LILLOW) 0.15-30 MG-MCG tablet    Last Written Prescription Date:  12/17/18  Last Fill Quantity: 84,  # refills: 0   Last office visit: 3/14/2018 with prescribing provider:  No    Future Office Visit:      Requested Prescriptions   Pending Prescriptions Disp Refills     levonorgestrel-ethinyl estradiol (LILLOW) 0.15-30 MG-MCG tablet 84 tablet 0     Sig: Take 1 tablet by mouth daily    Contraceptives Protocol Failed - 3/15/2019  2:25 PM       Failed - Recent (12 mo) or future (30 days) visit within the authorizing provider's specialty    Patient had office visit in the last 12 months or has a visit in the next 30 days with authorizing provider or within the authorizing provider's specialty.  See \"Patient Info\" tab in inbasket, or \"Choose Columns\" in Meds & Orders section of the refill encounter.             Passed - Patient is not a current smoker if age is 35 or older       Passed - Medication is active on med list       Passed - No active pregnancy on record       Passed - No positive pregnancy test in past 12 months        Routing refill request to provider for review/approval because:  Patient needs to be seen because it has been more than 1 year since last office visit.  Zora JOHNSONN, RN   [facility] [place]           "

## 2019-03-15 NOTE — TELEPHONE ENCOUNTER
It looks like at her visit with Dr. Duval in OB-Gyn this fall, they planned on stopping the OCP and monitoring for symptoms of menopause.  How has that gone?    Catrachita Leonard MD

## 2019-03-15 NOTE — TELEPHONE ENCOUNTER
Left a non detailed message for patient to return call.     Zora JOHNSONN, RN   [facility]

## 2019-03-19 DIAGNOSIS — Z30.41 ORAL CONTRACEPTIVE PILL SURVEILLANCE: ICD-10-CM

## 2019-03-19 NOTE — TELEPHONE ENCOUNTER
"Requested Prescriptions   Pending Prescriptions Disp Refills     LILLOW 0.15-30 MG-MCG tablet [Pharmacy Med Name: LILLOW-28 TABLET]  Last Written Prescription Date:  12/17/18  Last Fill Quantity: 84,  # refills: 0   Last office visit: 3/14/2018 with prescribing provider:  Renee   Future Office Visit:     84 tablet 0     Sig: TAKE 1 TABLET BY MOUTH DAILY    Contraceptives Protocol Failed - 3/19/2019  1:38 AM       Failed - Recent (12 mo) or future (30 days) visit within the authorizing provider's specialty    Patient had office visit in the last 12 months or has a visit in the next 30 days with authorizing provider or within the authorizing provider's specialty.  See \"Patient Info\" tab in inbasket, or \"Choose Columns\" in Meds & Orders section of the refill encounter.             Passed - Patient is not a current smoker if age is 35 or older       Passed - Medication is active on med list       Passed - No active pregnancy on record       Passed - No positive pregnancy test in past 12 months          "

## 2019-03-19 NOTE — TELEPHONE ENCOUNTER
Patient called back and stated that since she has stopped she hasnt gotten her period but that the hot flashes are better bothersome.     Routing to PCP as ANDRES JOHNSONN, RN   [facility]

## 2019-03-19 NOTE — TELEPHONE ENCOUNTER
Attempt #3    Left a non detailed message for patient to return call.     Multiple attempts made to patient.     Sending to PCP as EDGAR.    Zora LAWS, RN   [facility]

## 2019-03-20 RX ORDER — LEVONORGESTREL AND ETHINYL ESTRADIOL 0.15-0.03
KIT ORAL
Qty: 84 TABLET | Refills: 0 | OUTPATIENT
Start: 2019-03-20

## 2019-03-20 NOTE — TELEPHONE ENCOUNTER
She should schedule a visit with myself or OB-Gyn to discuss management of hot flashes and menopausal symptoms, if she decides on hormone treatment this is usually a different formulation that the OCP she was taking.     Catrachita Leonard MD

## 2019-03-20 NOTE — TELEPHONE ENCOUNTER
See 3/15/19 refill encounter. Patient no longer taking medication.   Valencia De Jesus RN   [facility] - Triage

## 2019-03-20 NOTE — TELEPHONE ENCOUNTER
Routing refill request to provider for review/approval because:  Protocol failed.  Notes from LOV with OB/Gyn: Will stop her OCPs once her current prescription runs out and monitor for symptoms of menopause.  If struggles with hot flushes/night sweats or other menopausal symptoms, will call to discuss HRT.       VETO Rivera, RN  [employer] [facility]

## 2019-07-04 DIAGNOSIS — J30.2 CHRONIC SEASONAL ALLERGIC RHINITIS: ICD-10-CM

## 2019-07-05 RX ORDER — ALBUTEROL SULFATE 90 UG/1
AEROSOL, METERED RESPIRATORY (INHALATION)
Qty: 18 INHALER | Refills: 0 | Status: SHIPPED | OUTPATIENT
Start: 2019-07-05

## 2019-07-05 NOTE — TELEPHONE ENCOUNTER
"Requested Prescriptions   Pending Prescriptions Disp Refills     VENTOLIN  (90 Base) MCG/ACT inhaler [Pharmacy Med Name: VENTOLIN HFA 90 MCG INHALER] 18 Inhaler 3     Sig: INHALE 2 PUFFS INTO THE LUNGS EVERY 4 HOURS AS NEEDED  Last Written Prescription Date:  1/8/18  Last Fill Quantity: 1,  # refills: 3   Last office visit: 3/14/2018 with prescribing provider:  Renee   Future Office Visit:           Asthma Maintenance Inhalers - Anticholinergics Failed - 7/4/2019 10:39 AM        Failed - Asthma control assessment score within normal limits in last 6 months     Please review ACT score.   No flowsheet data found.            Failed - Recent (6 mo) or future (30 days) visit within the authorizing provider's specialty     Patient had office visit in the last 6 months or has a visit in the next 30 days with authorizing provider or within the authorizing provider's specialty.  See \"Patient Info\" tab in inbasket, or \"Choose Columns\" in Meds & Orders section of the refill encounter.            Passed - Patient is age 12 years or older        Passed - Medication is active on med list          "

## 2019-10-03 ENCOUNTER — TRANSFERRED RECORDS (OUTPATIENT)
Dept: HEALTH INFORMATION MANAGEMENT | Facility: CLINIC | Age: 54
End: 2019-10-03

## 2019-11-22 ENCOUNTER — OFFICE VISIT (OUTPATIENT)
Dept: FAMILY MEDICINE | Facility: CLINIC | Age: 54
End: 2019-11-22
Payer: COMMERCIAL

## 2019-11-22 VITALS
WEIGHT: 131 LBS | TEMPERATURE: 96.8 F | SYSTOLIC BLOOD PRESSURE: 124 MMHG | HEART RATE: 65 BPM | OXYGEN SATURATION: 97 % | HEIGHT: 62 IN | BODY MASS INDEX: 24.11 KG/M2 | DIASTOLIC BLOOD PRESSURE: 72 MMHG

## 2019-11-22 DIAGNOSIS — Z13.0 SCREENING FOR IRON DEFICIENCY ANEMIA: ICD-10-CM

## 2019-11-22 DIAGNOSIS — R79.89 ELEVATED VITAMIN B12 LEVEL: Primary | ICD-10-CM

## 2019-11-22 DIAGNOSIS — R79.89 ELEVATED LFTS: ICD-10-CM

## 2019-11-22 PROCEDURE — 99214 OFFICE O/P EST MOD 30 MIN: CPT | Performed by: INTERNAL MEDICINE

## 2019-11-22 PROCEDURE — 36415 COLL VENOUS BLD VENIPUNCTURE: CPT | Performed by: INTERNAL MEDICINE

## 2019-11-22 PROCEDURE — 83540 ASSAY OF IRON: CPT | Performed by: INTERNAL MEDICINE

## 2019-11-22 PROCEDURE — 82728 ASSAY OF FERRITIN: CPT | Performed by: INTERNAL MEDICINE

## 2019-11-22 PROCEDURE — 83550 IRON BINDING TEST: CPT | Performed by: INTERNAL MEDICINE

## 2019-11-22 ASSESSMENT — MIFFLIN-ST. JEOR: SCORE: 1147.46

## 2019-11-22 NOTE — PROGRESS NOTES
"Subjective     Linda Campuzano is a 54 year old female who presents to clinic today for the following health issues:    HPI      is here with a few concerns today.  She had some blood work done at her OB/GYN appointment last month and was told the vitamin B12 level was high, her iron level was high, and her liver enzymes were abnormal.  In reviewing the blood work, her vitamin B12 level was 1600, , AST 72.  Her CBC was normal, I do not see that an iron test was done.     She is taking numerous supplements from [employer] -mental acuity, mind supplement, core energy, 180 metabolic vitamin, omega-3, vitamin D, vitamin D, calcium and magnesium, and a stress release supplement.    She has also had some aching, moves around.  Feels more sore after her normal workouts.  She has been off her OCP for a year or so now.  Has noticed some weight gain.  She reports she has always had hot flashes due to how much she exercises, not much worse now.  Has some questions about estrogen.         Reviewed and updated as needed this visit by Provider         Review of Systems   Msk, psych, endo reviewed,  otherwise negative unless noted above.        Objective    /72 (BP Location: Left arm, Patient Position: Sitting, Cuff Size: Adult Regular)   Pulse 65   Temp 96.8  F (36  C) (Tympanic)   Ht 1.575 m (5' 2\")   Wt 59.4 kg (131 lb)   LMP 09/19/2018 (Exact Date)   SpO2 97%   BMI 23.96 kg/m    Body mass index is 23.96 kg/m .  Physical Exam   GENERAL: healthy, alert and no distress  PSYCH: mentation appears normal, affect normal/bright and anxious            Assessment & Plan       ICD-10-CM    1. Elevated vitamin B12 level R74.8    2. Screening for iron deficiency anemia Z13.0 Ferritin     Iron and iron binding capacity   3. Elevated LFTs R94.5         is here with a number of concerns, the history is a little difficult to follow as she keeps jumping around.  She is worried that her lab results mean she " might have cancer.  She continues to wonder why her iron level is high or what could cause high iron, even though her iron level was not actually checked.  It sounds like there was a miscommunication between the nurse that called from her OB/GYN office.  We can certainly check that today to make sure it is not elevated.  Her vitamin B12 was likely elevated because 2 of the supplements she was taking contain vitamin B12.  We looked on line together at all the supplements she was taking and reviewed with a contained.  2 of them have vitamin B12.  It does not appear that any of them have iron.  There are number of extracts, etc. that I am not familiar with.  The mild elevation in her LFTs could be from any number of things.  We can repeat these in a couple months.  At this point I would not jump to recommending any hormonal replacement therapy.  She is going to try the women's health supplement from [employer].  Reassured that her BMI still within normal limits.  Regarding the achiness, she can certainly try NSAIDs as needed, which it sounds like she has not done.           Return in about 1 month (around 12/22/2019) for repeat LFTs and b12 .    Catrachita Leonard MD  [facility]

## 2019-11-23 LAB
FERRITIN SERPL-MCNC: 50 NG/ML (ref 8–252)
IRON SATN MFR SERPL: 39 % (ref 15–46)
IRON SERPL-MCNC: 136 UG/DL (ref 35–180)
TIBC SERPL-MCNC: 350 UG/DL (ref 240–430)

## 2019-12-09 ENCOUNTER — HEALTH MAINTENANCE LETTER (OUTPATIENT)
Age: 54
End: 2019-12-09

## 2020-05-13 ENCOUNTER — OFFICE VISIT (OUTPATIENT)
Dept: FAMILY MEDICINE | Facility: CLINIC | Age: 55
End: 2020-05-13
Payer: COMMERCIAL

## 2020-05-13 VITALS
TEMPERATURE: 97.6 F | WEIGHT: 134 LBS | BODY MASS INDEX: 24.51 KG/M2 | DIASTOLIC BLOOD PRESSURE: 70 MMHG | HEART RATE: 79 BPM | SYSTOLIC BLOOD PRESSURE: 110 MMHG

## 2020-05-13 DIAGNOSIS — M25.50 ARTHRALGIA, UNSPECIFIED JOINT: ICD-10-CM

## 2020-05-13 DIAGNOSIS — H93.8X1 SENSATION OF PLUGGED EAR ON RIGHT SIDE: Primary | ICD-10-CM

## 2020-05-13 PROCEDURE — 99213 OFFICE O/P EST LOW 20 MIN: CPT | Performed by: INTERNAL MEDICINE

## 2020-05-13 RX ORDER — CELECOXIB 100 MG/1
100 CAPSULE ORAL 2 TIMES DAILY PRN
Qty: 30 CAPSULE | Refills: 1 | Status: SHIPPED | OUTPATIENT
Start: 2020-05-13

## 2020-05-13 NOTE — PROGRESS NOTES
Subjective     Linda Campuzano is a 55 year old female who presents to clinic today for the following health issues:    HPI   Concern - Right ear clogged since yesterday am.    noticed a plugged feeling in her right ear starting yesterday morning.  Feels like she needs to pop it and her hearing is decreased.  Also some discomfort around the angle of the jaw in front of the ear on the right.  She denies significant allergy symptoms or nasal congestion, but has been working outside a lot doing gardening and landscaping at their new house.    She has a rash on her hands that she gets when taking NSAIDs consistently, also gets a similar rash around her mouth and on her cheeks.  She mentions this mainly to make sure it is not a symptom of COVID.  Also wondering what she can take for aching if NSAIDs give her a rash.    She and her  bought a house near her daughter's group home.  It has about an acre and there is a lot of work to do outside.  It has been frustrating that they cannot go and visit her daughter and bring her out for outings due to COVID-19.      Reviewed and updated as needed this visit by Provider         Review of Systems   Const, HEENT, msk, skin reviewed,  otherwise negative unless noted above.        Objective    /70 (BP Location: Left arm, Patient Position: Chair, Cuff Size: Adult Regular)   Pulse 79   Temp 97.6  F (36.4  C) (Tympanic)   Wt 60.8 kg (134 lb)   LMP 09/19/2018 (Exact Date)   BMI 24.51 kg/m    Body mass index is 24.51 kg/m .  Physical Exam   Gen: pleasant, well appearing woman, no distress  HEENT: ear canals and TM normal b/l. No jaw crepitus or clicking.   Neck: no LAD  Skin: mild pink/salmon colored rash on right dorsal hand               Assessment & Plan     1. Sensation of plugged ear on right side  Ear exam is completely normal. Advised can try sudafed and antihistamine. Given her complaint of jaw pain may be related to TMJ as well.     2. Arthralgia,  unspecified joint  Will see if celecoxib has less skin side effects that OTC NSAIDs.   - celecoxib (CELEBREX) 100 MG capsule; Take 1 capsule (100 mg) by mouth 2 times daily as needed for moderate pain  Dispense: 30 capsule; Refill: 1           Return in about 4 months (around 9/13/2020) for Physical Exam.    Catrachita Leonard MD  [facility]

## 2020-12-29 ENCOUNTER — TELEPHONE (OUTPATIENT)
Dept: FAMILY MEDICINE | Facility: CLINIC | Age: 55
End: 2020-12-29

## 2020-12-29 NOTE — TELEPHONE ENCOUNTER
Needs of attention regarding:  -Cervical Cancer Screening    Health Maintenance Topics with due status: Overdue       Topic Date Due    ADVANCE CARE PLANNING 1965    HIV SCREENING 02/08/1980    ZOSTER IMMUNIZATION 02/08/2015    COLORECTAL CANCER SCREENING 02/16/2017    PREVENTIVE CARE VISIT 10/12/2019    DTAP/TDAP/TD IMMUNIZATION 12/07/2019    PHQ-2 01/01/2020    HPV TEST 02/25/2020    PAP 02/25/2020    INFLUENZA VACCINE 09/01/2020    MAMMO SCREENING 10/12/2020       Communication:  See MyChart message

## 2021-01-15 ENCOUNTER — HEALTH MAINTENANCE LETTER (OUTPATIENT)
Age: 56
End: 2021-01-15

## 2021-02-05 ENCOUNTER — TELEPHONE (OUTPATIENT)
Dept: FAMILY MEDICINE | Facility: CLINIC | Age: 56
End: 2021-02-05

## 2021-02-05 NOTE — TELEPHONE ENCOUNTER
Needs of attention regarding:  -Breast Cancer Screening  -Cervical Cancer Screening  -Wellness (Physical) Visit     Health Maintenance Topics with due status: Overdue       Topic Date Due    ADVANCE CARE PLANNING 1965    HIV SCREENING 02/08/1980    ZOSTER IMMUNIZATION 02/08/2015    COLORECTAL CANCER SCREENING 02/16/2017    PREVENTIVE CARE VISIT 10/12/2019    DTAP/TDAP/TD IMMUNIZATION 12/07/2019    HPV TEST 02/25/2020    PAP 02/25/2020    INFLUENZA VACCINE 09/01/2020    MAMMO SCREENING 10/12/2020    PHQ-2 01/01/2021       Communication:  See Florentint evelyn Kang on 2/5/2021 at 12:00 PM

## 2021-02-11 NOTE — TELEPHONE ENCOUNTER
Called patient and her mailbox is full and I cannot leave a message.  Will try to call at a later time.    Viki Kang on 2/11/2021 at 9:09 AM

## 2021-02-16 NOTE — TELEPHONE ENCOUNTER
Needs of attention regarding:  -Breast Cancer Screening  -Cervical Cancer Screening  -Wellness (Physical) Visit     Health Maintenance Topics with due status: Overdue       Topic Date Due    ADVANCE CARE PLANNING 1965    HIV SCREENING 02/08/1980    ZOSTER IMMUNIZATION 02/08/2015    COLORECTAL CANCER SCREENING 02/16/2017    PREVENTIVE CARE VISIT 10/12/2019    DTAP/TDAP/TD IMMUNIZATION 12/07/2019    HPV TEST 02/25/2020    PAP 02/25/2020    INFLUENZA VACCINE 09/01/2020    MAMMO SCREENING 10/12/2020    PHQ-2 01/01/2021       Communication:  See MyChart message and left a voicemail

## 2021-10-24 ENCOUNTER — HEALTH MAINTENANCE LETTER (OUTPATIENT)
Age: 56
End: 2021-10-24

## 2022-02-13 ENCOUNTER — HEALTH MAINTENANCE LETTER (OUTPATIENT)
Age: 57
End: 2022-02-13

## 2022-10-15 ENCOUNTER — HEALTH MAINTENANCE LETTER (OUTPATIENT)
Age: 57
End: 2022-10-15

## 2023-03-26 ENCOUNTER — HEALTH MAINTENANCE LETTER (OUTPATIENT)
Age: 58
End: 2023-03-26